# Patient Record
Sex: MALE | Race: WHITE
[De-identification: names, ages, dates, MRNs, and addresses within clinical notes are randomized per-mention and may not be internally consistent; named-entity substitution may affect disease eponyms.]

---

## 2019-03-12 ENCOUNTER — HOSPITAL ENCOUNTER (EMERGENCY)
Dept: HOSPITAL 80 - FED | Age: 64
Discharge: HOME | End: 2019-03-12
Payer: COMMERCIAL

## 2019-03-12 VITALS — SYSTOLIC BLOOD PRESSURE: 106 MMHG | DIASTOLIC BLOOD PRESSURE: 85 MMHG

## 2019-03-12 DIAGNOSIS — R11.2: ICD-10-CM

## 2019-03-12 DIAGNOSIS — R10.9: ICD-10-CM

## 2019-03-12 DIAGNOSIS — E86.9: ICD-10-CM

## 2019-03-12 DIAGNOSIS — R42: Primary | ICD-10-CM

## 2019-03-12 LAB — PLATELET # BLD: 251 10^3/UL (ref 150–400)

## 2019-03-12 PROCEDURE — A9585 GADOBUTROL INJECTION: HCPCS

## 2019-03-12 NOTE — EDPHY
H & P


Smoking Status: Never smoked


Time Seen by Provider: 03/12/19 10:21


HPI/ROS: 





Chief complaint.  Dizziness





HPI.  63-year-old male presents with 1 week history dizziness.  He also 

complains of ringing in ears.  Generalized lack of strength.  No fever, chest 

pain, shortness of breath.  Nausea and vomiting without abdominal pain after 

dizziness.  Spinning and dizziness is worse with standing and moving his head.  

No previous history.  No URI symptoms.  No fever.  Some facial numbness on both 

cheeks.  However no peripheral weakness or paresthesias.





ROS


10 systems were reviewed and negative with the exception of the elements 

mentioned in the history of present illness


 (Yonatan Huerta)


Past Medical/Surgical History: 





Past medical history is significant for MI, stomach tumor, attention deficit 

hyperactivity disorder (Yonatan Huerta)


Social History: 


Single, nonsmoker, no alcohol (Yonatan Huerta)


Physical Exam: 





General Appearance:  Alert well-developed male mild distress vital signs are 

stable


Eyes: Pupils equal and round no pallor or injection.


ENT, Mouth:  Mucous membranes are moist.


Respiratory:  There are no retractions, lungs are clear to auscultation.


Cardiovascular: Regular rate and rhythm.


Gastrointestinal:   Abdomen is soft and nontender, no masses, bowel sounds 

normal.


Neurological:  Awake and alert, sensory and motor exams grossly normal.  Speech 

is normal.  Cranial nerves are normal.  There is no pronator drift.  Finger-to-

nose heel-to-shin are intact bilaterally


Skin: Warm and dry, no rashes.


Musculoskeletal:  Neck is supple nontender.


Extremities  symmetrical, full range of motion.


Psychiatric: Patient is oriented X 3, there is no agitation. (Yonatan Huerta)


Constitutional: 


 Initial Vital Signs











Temperature (C)  36.6 C   03/12/19 09:56


 


Heart Rate  78   03/12/19 09:56


 


Respiratory Rate  18   03/12/19 09:56


 


Blood Pressure  123/78 H  03/12/19 09:56


 


O2 Sat (%)  98   03/12/19 09:56








 











O2 Delivery Mode               Room Air














Allergies/Adverse Reactions: 


 





Penicillins Allergy (Verified 03/12/19 09:56)


 








Home Medications: 














 Medication  Instructions  Recorded


 


Diazepam [Valium 5 MG (*)] 5 mg PO TID PRN #10 tab 03/12/19


 


Meclizine HCl 25 mg PO Q6-8PRN PRN #14 tablet 03/12/19














Medical Decision Making





- Diagnostics


EKG Interpretation: 





EKG interpreted by me shows normal sinus rhythm with normal interval.  Left 

axis deviation.  QRS is normal there is no significant ST elevation or 

depression.  No arrhythmia.  The rate is 78 (Yonatan Huerta)


Procedures: 





IV normal saline.  Meclizine orally (Yonatan Huerta)


ED Course/Re-evaluation: 





Re-evaluation 1:10 p.m. Patient is still dizzy after the meclizine.  We 

discussed laboratory evaluation EKG findings.  We discussed MRI for continuing 

dizziness.  He expresses understanding (Yonatan Huerta)


Differential Diagnosis: 





I think that this is likely vertigo.  He has increased symptoms of dizziness 

with moving his head and position.  He has ringing in his ears.  Has a normal 

neurologic exam.  However he continued to be dizzy despite meclizine.  (Yonatan Huerta)


Other Provider: 





I assumed care of this patient from Dr. Yonatan Huerta.  I receive the results of 

his MRI head and neck.  There is nothing in either the studies that would 

explain his symptoms.  I relayed these results to the patient.  He feels well 

enough to return home now and, after the Ativan, his dizziness has improved.  

He requests a work excuse.  This will be provided.  He is also being given a 

prescription for Valium for treatment of vertigo. (Shiloh Copeland)


Care Turn Over: 





Care to Dr. Copeland at 3:15 p.m. MRI results pending (Yonatan Huerta)





- Data Points


Laboratory Results: 


 Laboratory Results





 03/12/19 10:54 





 03/12/19 10:54 








Medications Given: 


 








Discontinued Medications





Sodium Chloride (Ns)  1,000 mls @ 0 mls/hr IV EDNOW ONE; Wide Open


   PRN Reason: Protocol


   Stop: 03/12/19 10:45


   Last Admin: 03/12/19 10:55 Dose:  1,000 mls


Lorazepam (Ativan)  1 mg PO EDNOW ONE


   Stop: 03/12/19 14:19


   Last Admin: 03/12/19 14:24 Dose:  1 mg


Meclizine HCl (Meclizine Hcl)  25 mg PO EDNOW ONE


   Stop: 03/12/19 11:09


   Last Admin: 03/12/19 11:20 Dose:  25 mg





Point of Care Test Results: 


 Chemistry











  03/12/19 03/12/19





  15:16 10:59


 


POC Creatinine  1.1 mg/dL mg/dL  





   (0.7-1.3)  


 


POC Troponin I    0.01 ng/mL ng/mL





    (0.00-0.08) 














Departure





- Departure


Disposition: Home, Routine, Self-Care


Clinical Impression: 


 Vertigo





Condition: Fair


Instructions:  Vertigo (ED)


Additional Instructions: 


Meclizine 1 pill every 6-8 hours as needed for dizziness.  If you do not have 

relief with the meclizine, try one of the Valium pills.  The Valium will make 

you tired and possibly off balance.  Do not drive or operate machinery on this 

medication.





Return for worsening symptoms.





Ear Nose Throat follow-up in 2-3 days for continuing symptoms


Referrals: 


NONE *PRIMARY CARE P,. [Primary Care Provider] - As per Instructions


Yonatan Barrett MD [Medical Doctor] - 2-3 days, if not improved


Stand Alone Forms:  Work Excuse


Prescriptions: 


Diazepam [Valium 5 MG (*)] 5 mg PO TID PRN #10 tab


 PRN Reason: severe vertigo


Meclizine HCl 25 mg PO Q6-8PRN PRN #14 tablet


 PRN Reason: Dizziness

## 2019-03-12 NOTE — CPEKG
Test Reason : OPEN

Blood Pressure : ***/*** mmHG

Vent. Rate : 078 BPM     Atrial Rate : 077 BPM

   P-R Int : 175 ms          QRS Dur : 103 ms

    QT Int : 382 ms       P-R-T Axes : 060 -46 055 degrees

   QTc Int : 436 ms

 

Sinus rhythm

Left axis deviation

 

Confirmed by Kaiden Huerta ([id number]) on 3/12/2019 3:09:21 PM

 

Referred By: KAIDEN HUERTA           Confirmed By:Kaiden Huerta